# Patient Record
Sex: MALE | HISPANIC OR LATINO | Employment: FULL TIME | URBAN - METROPOLITAN AREA
[De-identification: names, ages, dates, MRNs, and addresses within clinical notes are randomized per-mention and may not be internally consistent; named-entity substitution may affect disease eponyms.]

---

## 2018-09-05 ENCOUNTER — HOSPITAL ENCOUNTER (OUTPATIENT)
Dept: RADIOLOGY | Facility: MEDICAL CENTER | Age: 23
End: 2018-09-05
Attending: EMERGENCY MEDICINE
Payer: COMMERCIAL

## 2018-09-05 ENCOUNTER — OFFICE VISIT (OUTPATIENT)
Dept: URGENT CARE | Facility: PHYSICIAN GROUP | Age: 23
End: 2018-09-05
Payer: COMMERCIAL

## 2018-09-05 VITALS
TEMPERATURE: 97.2 F | HEART RATE: 78 BPM | BODY MASS INDEX: 35.99 KG/M2 | WEIGHT: 243 LBS | SYSTOLIC BLOOD PRESSURE: 124 MMHG | OXYGEN SATURATION: 97 % | RESPIRATION RATE: 12 BRPM | DIASTOLIC BLOOD PRESSURE: 78 MMHG | HEIGHT: 69 IN

## 2018-09-05 DIAGNOSIS — S46.912A STRAIN OF LEFT SHOULDER, INITIAL ENCOUNTER: ICD-10-CM

## 2018-09-05 PROCEDURE — 99214 OFFICE O/P EST MOD 30 MIN: CPT | Performed by: EMERGENCY MEDICINE

## 2018-09-05 PROCEDURE — 73000 X-RAY EXAM OF COLLAR BONE: CPT | Mod: LT

## 2018-09-05 PROCEDURE — 73030 X-RAY EXAM OF SHOULDER: CPT | Mod: LT

## 2018-09-05 ASSESSMENT — PAIN SCALES - GENERAL: PAINLEVEL: 8=MODERATE-SEVERE PAIN

## 2018-09-05 NOTE — PROGRESS NOTES
"Subjective:      Corona Whitt is a 23 y.o. male who presents with Shoulder Pain (Lt shoulder pain, limited ROM x2days )            HPI  Patient is a 23-year-old male with shoulder pain and limited range of motion.    Patient gives no history of obvious trauma.    PMH:  has no past medical history of ASTHMA or Diabetes.  MEDS:   Current Outpatient Prescriptions:   •  sulfamethoxazole-trimethoprim (BACTRIM DS) 800-160 MG tablet, Take 1 Tab by mouth 2 times a day., Disp: 30 Tab, Rfl: 1  •  amoxicillin-clavulanate (AUGMENTIN) 875-125 MG Tab, Take 1 Tab by mouth 2 times a day., Disp: 30 Tab, Rfl: 1  •  oxycodone immediate-release (ROXICODONE) 5 MG Tab, Take 1 Tab by mouth every 6 hours as needed for Severe Pain. (Patient not taking: Reported on 9/20/2016), Disp: 30 Tab, Rfl: 0  •  ibuprofen (MOTRIN) 200 MG Tab, Take 600 mg by mouth every 6 hours as needed for Mild Pain., Disp: , Rfl:   ALLERGIES:   Allergies   Allergen Reactions   • Other Food Swelling     Patient reports all tree nuts, causes throat to swell     SURGHX:   Past Surgical History:   Procedure Laterality Date   • OTHER ORTHOPEDIC SURGERY      torn meniscus right knee     SOCHX:  reports that he has been smoking.  He has never used smokeless tobacco. He reports that he uses drugs, including Marijuana.  FH: family history includes Diabetes in his father.  ROS       Objective:     /78   Pulse 78   Temp 36.2 °C (97.2 °F)   Resp 12   Ht 1.753 m (5' 9\")   Wt 110.2 kg (243 lb)   SpO2 97%   BMI 35.88 kg/m²      Physical Exam            Assessment/Plan:     Diagnosis: Left shoulder pain.      "

## 2018-09-05 NOTE — LETTER
September 5, 2018        Corona Whitt  [address] [facility] [address]        Dear Corona:    Please ask to be excused from work for the next three days.    If you have any questions or concerns, please don't hesitate to call.        Sincerely,        Jf Garcia M.D.    Electronically Signed

## 2019-12-09 ENCOUNTER — HOSPITAL ENCOUNTER (OUTPATIENT)
Facility: MEDICAL CENTER | Age: 24
End: 2019-12-09
Attending: PHYSICIAN ASSISTANT
Payer: COMMERCIAL

## 2019-12-09 ENCOUNTER — OFFICE VISIT (OUTPATIENT)
Dept: URGENT CARE | Facility: PHYSICIAN GROUP | Age: 24
End: 2019-12-09
Payer: COMMERCIAL

## 2019-12-09 VITALS
BODY MASS INDEX: 37.22 KG/M2 | TEMPERATURE: 97.2 F | OXYGEN SATURATION: 98 % | SYSTOLIC BLOOD PRESSURE: 128 MMHG | HEART RATE: 68 BPM | HEIGHT: 70 IN | WEIGHT: 260 LBS | RESPIRATION RATE: 16 BRPM | DIASTOLIC BLOOD PRESSURE: 84 MMHG

## 2019-12-09 DIAGNOSIS — N30.01 ACUTE CYSTITIS WITH HEMATURIA: ICD-10-CM

## 2019-12-09 DIAGNOSIS — Z72.51 HIGH RISK HETEROSEXUAL BEHAVIOR: ICD-10-CM

## 2019-12-09 DIAGNOSIS — R30.0 DYSURIA: ICD-10-CM

## 2019-12-09 LAB
APPEARANCE UR: NORMAL
BILIRUB UR STRIP-MCNC: NEGATIVE MG/DL
COLOR UR AUTO: YELLOW
GLUCOSE UR STRIP.AUTO-MCNC: NEGATIVE MG/DL
KETONES UR STRIP.AUTO-MCNC: NEGATIVE MG/DL
LEUKOCYTE ESTERASE UR QL STRIP.AUTO: NORMAL
NITRITE UR QL STRIP.AUTO: NEGATIVE
PH UR STRIP.AUTO: 6.5 [PH] (ref 5–8)
PROT UR QL STRIP: NEGATIVE MG/DL
RBC UR QL AUTO: NORMAL
SP GR UR STRIP.AUTO: 1.02
UROBILINOGEN UR STRIP-MCNC: 0.2 MG/DL

## 2019-12-09 PROCEDURE — 87086 URINE CULTURE/COLONY COUNT: CPT

## 2019-12-09 PROCEDURE — 87491 CHLMYD TRACH DNA AMP PROBE: CPT

## 2019-12-09 PROCEDURE — 99214 OFFICE O/P EST MOD 30 MIN: CPT | Performed by: PHYSICIAN ASSISTANT

## 2019-12-09 PROCEDURE — 81002 URINALYSIS NONAUTO W/O SCOPE: CPT | Performed by: PHYSICIAN ASSISTANT

## 2019-12-09 PROCEDURE — 87591 N.GONORRHOEAE DNA AMP PROB: CPT

## 2019-12-09 RX ORDER — SULFAMETHOXAZOLE AND TRIMETHOPRIM 800; 160 MG/1; MG/1
1 TABLET ORAL EVERY 12 HOURS
Qty: 28 TAB | Refills: 0 | Status: SHIPPED | OUTPATIENT
Start: 2019-12-09 | End: 2019-12-23

## 2019-12-09 ASSESSMENT — ENCOUNTER SYMPTOMS
VOMITING: 0
CHILLS: 0
ANOREXIA: 0
FEVER: 0
ABDOMINAL PAIN: 0
NAUSEA: 0
FATIGUE: 0
FLANK PAIN: 0
CHANGE IN BOWEL HABIT: 0
HEADACHES: 0
SORE THROAT: 0
SWEATS: 0
ARTHRALGIAS: 0

## 2019-12-10 LAB
C TRACH DNA SPEC QL NAA+PROBE: NEGATIVE
N GONORRHOEA DNA SPEC QL NAA+PROBE: NEGATIVE
SPECIMEN SOURCE: NORMAL

## 2019-12-10 NOTE — PROGRESS NOTES
Subjective:   Corona Whitt is a 24 y.o. male who presents for UTI (possible, burning sensation when urinating x1week)        Dysuria    This is a new problem. The current episode started in the past 7 days. The problem occurs every urination. The problem has been unchanged. The quality of the pain is described as burning. The pain is mild. There has been no fever. He is sexually active. There is no history of pyelonephritis. Pertinent negatives include no chills, discharge, flank pain, frequency, hematuria, hesitancy, nausea, sweats, urgency or vomiting. He has tried nothing for the symptoms. The treatment provided no relief. There is no history of recurrent UTIs. denies hx of STI   Sexually Transmitted Diseases   This is a new problem. The current episode started more than 1 month ago (unprotected sex with partner of unknown status 2 months ago). The problem occurs constantly. The problem has been unchanged. Pertinent negatives include no abdominal pain, anorexia, arthralgias, change in bowel habit, chills, fatigue, fever, headaches, nausea, sore throat or vomiting. Associated symptoms comments: No penile lesions, discharge, abdominal pain. Nothing aggravates the symptoms. He has tried nothing for the symptoms. The treatment provided no relief.     Review of Systems   Constitutional: Negative for chills, fatigue and fever.   HENT: Negative for sore throat.    Gastrointestinal: Negative for abdominal pain, anorexia, change in bowel habit, nausea and vomiting.   Genitourinary: Positive for dysuria. Negative for flank pain, frequency, hematuria, hesitancy and urgency.   Musculoskeletal: Negative for arthralgias.   Neurological: Negative for headaches.       PMH:  has no past medical history of ASTHMA or Diabetes.  MEDS:   Current Outpatient Medications:   •  sulfamethoxazole-trimethoprim (BACTRIM DS) 800-160 MG tablet, Take 1 Tab by mouth every 12 hours for 14 days., Disp: 28 Tab, Rfl: 0  •  oxycodone  "immediate-release (ROXICODONE) 5 MG Tab, Take 1 Tab by mouth every 6 hours as needed for Severe Pain. (Patient not taking: Reported on 9/20/2016), Disp: 30 Tab, Rfl: 0  •  ibuprofen (MOTRIN) 200 MG Tab, Take 600 mg by mouth every 6 hours as needed for Mild Pain., Disp: , Rfl:   ALLERGIES:   Allergies   Allergen Reactions   • Other Food Swelling     Patient reports all tree nuts, causes throat to swell     SURGHX:   Past Surgical History:   Procedure Laterality Date   • OTHER ORTHOPEDIC SURGERY      torn meniscus right knee     SOCHX:  reports that he has quit smoking. His smoking use included cigarettes. He smoked 0.00 packs per day. He has never used smokeless tobacco. He reports current drug use. Drug: Marijuana.  FH: Family history was reviewed, no pertinent findings to report   Objective:   /84 (BP Location: Left arm, Patient Position: Sitting, BP Cuff Size: Large adult)   Pulse 68   Temp 36.2 °C (97.2 °F) (Temporal)   Resp 16   Ht 1.778 m (5' 10\")   Wt 117.9 kg (260 lb)   SpO2 98%   BMI 37.31 kg/m²   Physical Exam  Vitals signs reviewed.   Constitutional:       General: He is not in acute distress.     Appearance: Normal appearance. He is well-developed. He is not toxic-appearing.   HENT:      Head: Normocephalic and atraumatic.      Right Ear: External ear normal.      Left Ear: External ear normal.      Nose: Nose normal.   Eyes:      General: Lids are normal.      Conjunctiva/sclera: Conjunctivae normal.   Neck:      Musculoskeletal: Neck supple.   Cardiovascular:      Rate and Rhythm: Normal rate and regular rhythm.   Pulmonary:      Effort: Pulmonary effort is normal. No respiratory distress.      Breath sounds: Normal breath sounds.   Abdominal:      General: Abdomen is flat.      Palpations: Abdomen is soft.      Tenderness: There is no right CVA tenderness, left CVA tenderness, guarding or rebound. Negative signs include Roque's sign, Rovsing's sign and McBurney's sign.      Hernia: There " is no hernia in the right inguinal area or left inguinal area.   Genitourinary:     Penis: Circumcised. No phimosis, paraphimosis, hypospadias, erythema, tenderness, discharge, swelling or lesions.       Scrotum/Testes: Normal.         Right: Mass, tenderness, swelling, testicular hydrocele or varicocele not present. Right testis is descended.         Left: Mass, tenderness, swelling, testicular hydrocele or varicocele not present. Left testis is descended.      Epididymis:      Right: Normal.      Left: Normal.   Musculoskeletal:      Comments: Normal range of motion. Exhibits no edema and no tenderness.    Skin:     General: Skin is warm and dry.      Capillary Refill: Capillary refill takes less than 2 seconds.   Neurological:      Mental Status: He is alert and oriented to person, place, and time.      Cranial Nerves: No cranial nerve deficit.      Sensory: No sensory deficit.   Psychiatric:         Speech: Speech normal.         Behavior: Behavior normal.         Thought Content: Thought content normal.         Judgment: Judgment normal.           Assessment/Plan:   1. Acute cystitis with hematuria  - Urine Culture; Future  - sulfamethoxazole-trimethoprim (BACTRIM DS) 800-160 MG tablet; Take 1 Tab by mouth every 12 hours for 14 days.  Dispense: 28 Tab; Refill: 0    2. Dysuria  - POCT Urinalysis  - CHLAMYDIA/GC PCR URINE OR SWAB; Future    3. High risk heterosexual behavior  - CHLAMYDIA/GC PCR URINE OR SWAB; Future  - HIV AG/AB COMBO ASSAY SCREENING; Future  - HSV 1/2 IGG W/ TYPE SPECIFIC RFLX; Future  - T.PALLIDUM AB EIA    Results for orders placed or performed in visit on 12/09/19   POCT Urinalysis   Result Value Ref Range    POC Color Yellow Negative    POC Appearance Slightly Cloudy Negative    POC Leukocyte Esterase Small Negative    POC Nitrites Negative Negative    POC Urobiligen 0.2 Negative (0.2) mg/dL    POC Protein Negative Negative mg/dL    POC Urine PH 6.5 5.0 - 8.0    POC Blood Trace-intact  Negative    POC Specific Gravity 1.025 <1.005 - >1.030    POC Ketones Negative Negative mg/dL    POC Bilirubin Negative Negative mg/dL    POC Glucose Negative Negative mg/dL      UA and PE consistent with acute cystitis. Pyelonephritis unlikely as pt has no flank pain, CVA tenderness, N/V, F/C. G/C are also considerations, however no d/c and pt is asymptomatic.  I will screen and G/C and other STI due to increased risk.  I will contact pt with results and adjust tx plan as indicated.    Pt instructed to complete full course of medication despite symptomatic improvement. Pt to take med with meals to alleviate GI upset. Pt to take a probiotic or eat Emily’s yogurt/ kefir while taking the medication.    Differential diagnosis, natural history, supportive care, and indications for immediate follow-up discussed.

## 2019-12-12 LAB
BACTERIA UR CULT: NORMAL
SIGNIFICANT IND 70042: NORMAL
SITE SITE: NORMAL
SOURCE SOURCE: NORMAL

## 2019-12-18 ENCOUNTER — HOSPITAL ENCOUNTER (OUTPATIENT)
Dept: LAB | Facility: MEDICAL CENTER | Age: 24
End: 2019-12-18
Attending: PHYSICIAN ASSISTANT
Payer: COMMERCIAL

## 2019-12-18 DIAGNOSIS — Z72.51 HIGH RISK HETEROSEXUAL BEHAVIOR: ICD-10-CM

## 2019-12-18 LAB — TREPONEMA PALLIDUM IGG+IGM AB [PRESENCE] IN SERUM OR PLASMA BY IMMUNOASSAY: NON REACTIVE

## 2019-12-18 PROCEDURE — 86780 TREPONEMA PALLIDUM: CPT

## 2019-12-18 PROCEDURE — 36415 COLL VENOUS BLD VENIPUNCTURE: CPT

## 2019-12-18 PROCEDURE — 87389 HIV-1 AG W/HIV-1&-2 AB AG IA: CPT

## 2019-12-18 PROCEDURE — 86694 HERPES SIMPLEX NES ANTBDY: CPT

## 2019-12-19 LAB — HIV 1+2 AB+HIV1 P24 AG SERPL QL IA: NON REACTIVE

## 2019-12-20 LAB — HSV1+2 IGG SER IA-ACNC: 0.64 IV

## 2020-05-05 ENCOUNTER — HOSPITAL ENCOUNTER (OUTPATIENT)
Facility: MEDICAL CENTER | Age: 25
End: 2020-05-05
Attending: PHYSICIAN ASSISTANT
Payer: COMMERCIAL

## 2020-05-05 ENCOUNTER — OFFICE VISIT (OUTPATIENT)
Dept: URGENT CARE | Facility: PHYSICIAN GROUP | Age: 25
End: 2020-05-05
Payer: COMMERCIAL

## 2020-05-05 VITALS
HEIGHT: 70 IN | WEIGHT: 258 LBS | HEART RATE: 88 BPM | SYSTOLIC BLOOD PRESSURE: 132 MMHG | TEMPERATURE: 97 F | OXYGEN SATURATION: 98 % | BODY MASS INDEX: 36.94 KG/M2 | DIASTOLIC BLOOD PRESSURE: 72 MMHG | RESPIRATION RATE: 12 BRPM

## 2020-05-05 DIAGNOSIS — R30.0 DYSURIA: ICD-10-CM

## 2020-05-05 LAB
APPEARANCE UR: CLEAR
BILIRUB UR STRIP-MCNC: NEGATIVE MG/DL
C TRACH DNA SPEC QL NAA+PROBE: NEGATIVE
COLOR UR AUTO: YELLOW
GLUCOSE UR STRIP.AUTO-MCNC: NEGATIVE MG/DL
KETONES UR STRIP.AUTO-MCNC: NEGATIVE MG/DL
LEUKOCYTE ESTERASE UR QL STRIP.AUTO: NEGATIVE
N GONORRHOEA DNA SPEC QL NAA+PROBE: NEGATIVE
NITRITE UR QL STRIP.AUTO: NEGATIVE
PH UR STRIP.AUTO: 6 [PH] (ref 5–8)
PROT UR QL STRIP: NEGATIVE MG/DL
RBC UR QL AUTO: NEGATIVE
SP GR UR STRIP.AUTO: 1.02
SPECIMEN SOURCE: NORMAL
UROBILINOGEN UR STRIP-MCNC: 0.2 MG/DL

## 2020-05-05 PROCEDURE — 87491 CHLMYD TRACH DNA AMP PROBE: CPT

## 2020-05-05 PROCEDURE — 81002 URINALYSIS NONAUTO W/O SCOPE: CPT | Performed by: PHYSICIAN ASSISTANT

## 2020-05-05 PROCEDURE — 99214 OFFICE O/P EST MOD 30 MIN: CPT | Performed by: PHYSICIAN ASSISTANT

## 2020-05-05 PROCEDURE — 87086 URINE CULTURE/COLONY COUNT: CPT

## 2020-05-05 PROCEDURE — 87591 N.GONORRHOEAE DNA AMP PROB: CPT

## 2020-05-05 RX ORDER — CEPHALEXIN 500 MG/1
500 CAPSULE ORAL 4 TIMES DAILY
Qty: 20 CAP | Refills: 0 | Status: SHIPPED | OUTPATIENT
Start: 2020-05-05 | End: 2020-05-10

## 2020-05-05 RX ORDER — SULFAMETHOXAZOLE AND TRIMETHOPRIM 800; 160 MG/1; MG/1
1 TABLET ORAL EVERY 12 HOURS
Qty: 28 TAB | Refills: 0 | Status: CANCELLED | OUTPATIENT
Start: 2020-05-05 | End: 2020-05-19

## 2020-05-05 ASSESSMENT — ENCOUNTER SYMPTOMS
ABDOMINAL PAIN: 0
FEVER: 0
BLOOD IN STOOL: 0
NAUSEA: 0
FLANK PAIN: 0
CHILLS: 0
VOMITING: 0
BACK PAIN: 0
COUGH: 0
SHORTNESS OF BREATH: 0
DIARRHEA: 0
MYALGIAS: 0
CONSTIPATION: 0

## 2020-05-05 NOTE — PROGRESS NOTES
"Subjective:   Corona Whitt  is a 25 y.o. male who presents for Urinary Pain (Urinary irritation x2days )      HPI  Patient comes to clinic noting mild intermittent sensation of dysuria beginning and end of stream.  Notes happened initially last December was seen through urgent care, urinalysis showed small leuks complete STI testing and urine culture came back negative at that time.  Patient notes improved symptoms thereafter having had a course of Bactrim.  This time he notes initial sensation of mild dysuria around 2 weeks ago.  Has had mild intermittent similar sensation.  Denies other symptoms of UTI.  Denies fevers chills nausea vomiting.  Denies abdominal pain or back pain.  Denies history of diagnosed kidney stone pyelonephritis or urinary tract infection.  Denies concern for new sexual partners but notes he does have unprotected sex with his current girlfriend.  Denies penile discharge or genital rash.  Review of Systems   Constitutional: Negative for chills and fever.   Respiratory: Negative for cough and shortness of breath.    Gastrointestinal: Negative for abdominal pain, blood in stool, constipation, diarrhea, nausea and vomiting.   Genitourinary: Positive for dysuria ( Mild, inconsistent). Negative for flank pain, frequency, hematuria and urgency.   Musculoskeletal: Negative for back pain and myalgias.   Skin: Negative for rash.       Allergies   Allergen Reactions   • Other Food Swelling     Patient reports all tree nuts, causes throat to swell        Objective:   /72   Pulse 88   Temp 36.1 °C (97 °F) (Temporal)   Resp 12   Ht 1.778 m (5' 10\")   Wt 117 kg (258 lb)   SpO2 98%   BMI 37.02 kg/m²     Physical Exam  Vitals signs and nursing note reviewed.   Constitutional:       General: He is not in acute distress.     Appearance: Normal appearance. He is well-developed. He is not diaphoretic.   HENT:      Head: Normocephalic and atraumatic.      Right Ear: External ear normal.      Left " Ear: External ear normal.      Nose: Nose normal.   Eyes:      General: No scleral icterus.        Right eye: No discharge.         Left eye: No discharge.      Conjunctiva/sclera: Conjunctivae normal.   Neck:      Musculoskeletal: Neck supple.   Pulmonary:      Effort: Pulmonary effort is normal. No respiratory distress.   Abdominal:      Palpations: Abdomen is soft. Abdomen is not rigid.      Tenderness: There is no abdominal tenderness. There is no guarding.   Musculoskeletal: Normal range of motion.   Skin:     General: Skin is warm and dry.      Coloration: Skin is not pale.   Neurological:      Mental Status: He is alert and oriented to person, place, and time.      Coordination: Coordination normal.          Results for orders placed or performed in visit on 05/05/20   POCT Urinalysis   Result Value Ref Range    POC Color YELLOW Negative    POC Appearance CLEAR Negative    POC Leukocyte Esterase NEGATIVE Negative    POC Nitrites NEGATIVE Negative    POC Urobiligen 0.2 Negative (0.2) mg/dL    POC Protein NEGATIVE Negative mg/dL    POC Urine PH 6.0 5.0 - 8.0    POC Blood NEGATIVE Negative    POC Specific Gravity 1.025 <1.005 - >1.030    POC Ketones NEGATIVE Negative mg/dL    POC Bilirubin NEGATIVE Negative mg/dL    POC Glucose NEGATIVE Negative mg/dL           Assessment/Plan:   1. Dysuria  - POCT Urinalysis  - CHLAMYDIA/GC PCR URINE OR SWAB; Future  - URINE CULTURE(NEW); Future  - REFERRAL TO UROLOGY  - cephALEXin (KEFLEX) 500 MG Cap; Take 1 Cap by mouth 4 times a day for 5 days.  Dispense: 20 Cap; Refill: 0  Supportive care is reviewed with patient/caregiver - recommend to push PO fluids and electrolytes, nsaids/tylenol, rest, full course of abx, probiotics w/ abx, azo/cran, observe for resolution  Return to clinic with lack of resolution or progression of symptoms.  Will call if change of abx is indicated by urine cx  Patient is directed to [facility], [facility] or primary care for a  complete battery of STI testing    I have worn an N95 mask, gloves and eye protection for the entire encounter with this patient.     Differential diagnosis, natural history, supportive care, and indications for immediate follow-up discussed.

## 2020-05-05 NOTE — PATIENT INSTRUCTIONS
Urinary Tract Infection, Adult  Introduction  A urinary tract infection (UTI) is an infection of any part of the urinary tract. The urinary tract includes the:  · Kidneys.  · Ureters.  · Bladder.  · Urethra.  These organs make, store, and get rid of pee (urine) in the body.  Follow these instructions at home:  · Take over-the-counter and prescription medicines only as told by your doctor.  · If you were prescribed an antibiotic medicine, take it as told by your doctor. Do not stop taking the antibiotic even if you start to feel better.  · Avoid the following drinks:  ¨ Alcohol.  ¨ Caffeine.  ¨ Tea.  ¨ Carbonated drinks.  · Drink enough fluid to keep your pee clear or pale yellow.  · Keep all follow-up visits as told by your doctor. This is important.  · Make sure to:  ¨ Empty your bladder often and completely. Do not to hold pee for long periods of time.  ¨ Empty your bladder before and after sex.  ¨ Wipe from front to back after a bowel movement if you are female. Use each tissue one time when you wipe.  Contact a doctor if:  · You have back pain.  · You have a fever.  · You feel sick to your stomach (nauseous).  · You throw up (vomit).  · Your symptoms do not get better after 3 days.  · Your symptoms go away and then come back.  Get help right away if:  · You have very bad back pain.  · You have very bad lower belly (abdominal) pain.  · You are throwing up and cannot keep down any medicines or water.  This information is not intended to replace advice given to you by your health care provider. Make sure you discuss any questions you have with your health care provider.  Document Released: 06/05/2009 Document Revised: 05/25/2017 Document Reviewed: 11/07/2016  © 2017 [employer]      Urinary Tract Infection, Adult  Introduction  A urinary tract infection (UTI) is an infection of any part of the urinary tract. The urinary tract includes the:  · Kidneys.  · Ureters.  · Bladder.  · Urethra.  These organs make, store,  and get rid of pee (urine) in the body.  Follow these instructions at home:  · Take over-the-counter and prescription medicines only as told by your doctor.  · If you were prescribed an antibiotic medicine, take it as told by your doctor. Do not stop taking the antibiotic even if you start to feel better.  · Avoid the following drinks:  ¨ Alcohol.  ¨ Caffeine.  ¨ Tea.  ¨ Carbonated drinks.  · Drink enough fluid to keep your pee clear or pale yellow.  · Keep all follow-up visits as told by your doctor. This is important.  · Make sure to:  ¨ Empty your bladder often and completely. Do not to hold pee for long periods of time.  ¨ Empty your bladder before and after sex.  ¨ Wipe from front to back after a bowel movement if you are female. Use each tissue one time when you wipe.  Contact a doctor if:  · You have back pain.  · You have a fever.  · You feel sick to your stomach (nauseous).  · You throw up (vomit).  · Your symptoms do not get better after 3 days.  · Your symptoms go away and then come back.  Get help right away if:  · You have very bad back pain.  · You have very bad lower belly (abdominal) pain.  · You are throwing up and cannot keep down any medicines or water.  This information is not intended to replace advice given to you by your health care provider. Make sure you discuss any questions you have with your health care provider.  Document Released: 06/05/2009 Document Revised: 05/25/2017 Document Reviewed: 11/07/2016  © 2017 [employer]

## 2020-05-07 LAB
BACTERIA UR CULT: NORMAL
SIGNIFICANT IND 70042: NORMAL
SITE SITE: NORMAL
SOURCE SOURCE: NORMAL

## 2022-04-24 ENCOUNTER — OFFICE VISIT (OUTPATIENT)
Dept: URGENT CARE | Facility: PHYSICIAN GROUP | Age: 27
End: 2022-04-24
Payer: COMMERCIAL

## 2022-04-24 VITALS
SYSTOLIC BLOOD PRESSURE: 130 MMHG | HEIGHT: 70 IN | DIASTOLIC BLOOD PRESSURE: 80 MMHG | BODY MASS INDEX: 37.22 KG/M2 | TEMPERATURE: 97.5 F | HEART RATE: 79 BPM | RESPIRATION RATE: 16 BRPM | WEIGHT: 260 LBS | OXYGEN SATURATION: 96 %

## 2022-04-24 DIAGNOSIS — Z23 NEED FOR TDAP VACCINATION: ICD-10-CM

## 2022-04-24 DIAGNOSIS — S30.0XXA COCCYGEAL CONTUSION, INITIAL ENCOUNTER: ICD-10-CM

## 2022-04-24 DIAGNOSIS — S80.812A ABRASION OF LEFT LOWER EXTREMITY, INITIAL ENCOUNTER: ICD-10-CM

## 2022-04-24 PROCEDURE — 90715 TDAP VACCINE 7 YRS/> IM: CPT | Performed by: EMERGENCY MEDICINE

## 2022-04-24 PROCEDURE — 90471 IMMUNIZATION ADMIN: CPT | Performed by: EMERGENCY MEDICINE

## 2022-04-24 PROCEDURE — 99213 OFFICE O/P EST LOW 20 MIN: CPT | Mod: 25 | Performed by: EMERGENCY MEDICINE

## 2022-04-24 ASSESSMENT — ENCOUNTER SYMPTOMS
BACK PAIN: 0
SENSORY CHANGE: 0
FOCAL WEAKNESS: 0

## 2022-04-24 NOTE — PROGRESS NOTES
"Subjective     Corona Whitt is a 27 y.o. male who presents with Leg Injury (Fell and landed on a nail and puncture (L) thigh x 1 day)            Leg Injury   Incident onset: yesterday. The incident occurred at home. The injury mechanism was a fall. The pain is present in the left thigh. He reports no foreign bodies present.   Patient states while working on home project, being he was walking on broke and he landed on his tailbone area.  Also notes abrasions from nails on left thigh.  Not up-to-date on tetanus booster.  No other trauma.  Review of Systems   Musculoskeletal: Negative for back pain and joint pain.   Neurological: Negative for sensory change and focal weakness.              Objective     /80 (BP Location: Left arm, Patient Position: Standing, BP Cuff Size: Large adult)   Pulse 79   Temp 36.4 °C (97.5 °F) (Temporal)   Resp 16   Ht 1.778 m (5' 10\")   Wt 118 kg (260 lb)   SpO2 96%   BMI 37.31 kg/m²      Physical Exam  Constitutional:       General: He is not in acute distress.     Appearance: He is well-developed.   Musculoskeletal:        Back:    Skin:     General: Skin is warm and dry.      Findings: Wound present.          Neurological:      Mental Status: He is alert.      Gait: Gait normal.   Psychiatric:         Behavior: Behavior is cooperative.                         Advised may consider imaging if desired.    Assessment & Plan        1. Coccygeal contusion, initial encounter  Ice, OTC analgesia as needed, unweighting cushion as needed.    2. Abrasion of left lower extremity, initial encounter  Cleansed and dressed with antibiotic ointment.  Home care instructions provided.  Recheck in 2 to 3 days if any concerns.    3. Need for Tdap vaccination  - Tdap =>8yo IM                "

## 2022-04-24 NOTE — LETTER
April 25, 2022         Patient: Corona Whitt   YOB: 1995   Date of Visit: 4/24/2022           To Whom it May Concern:    Corona Whitt was seen in my clinic on 4/24/2022. He may return to work on 4/27/2022.    If you have any questions or concerns, please don't hesitate to call.        Sincerely,           Marcelo Babcock M.D.  Electronically Signed

## 2022-04-24 NOTE — PATIENT INSTRUCTIONS
Tailbone Injury    The tailbone (coccyx) is the small bone at the lower end of the spine. A tailbone injury may involve stretched ligaments, bruising, or a broken bone (fracture). Tailbone injuries can be painful, and some may take a long time to heal.  What are the causes?  This condition may be caused by:  · Falling and landing on the tailbone.  · Repeated strain or friction from sitting for long periods of time. This may include actions such as rowing and bicycling.  · Childbirth.  In some cases, the cause may not be known.  What are the signs or symptoms?  Symptoms of this condition include:  · Pain in the tailbone area or lower back, especially when sitting.  · Pain or difficulty when standing up from a sitting position.  · Bruising or swelling in the tailbone area.  · Painful bowel movements.  · In women, pain during intercourse.  How is this diagnosed?  This condition may be diagnosed based on:  · Your symptoms.  · A physical exam.  If your health care provider suspects a fracture, you may have additional tests, such as:  · X-rays.  · CT scan.  · MRI.  How is this treated?  Most tailbone injuries heal on their own in 4-6 weeks. However, recovery time may be longer if the injury involves a fracture.  Treatment for this condition may include:  · NSAIDs or other over-the-counter medicines to help relieve your pain.  · Using a large, rubber or inflated ring or cushion to take pressure off the tailbone when sitting.  · Physical therapy.  · Injecting the tailbone area with local anesthesia and steroid medicine. This is not normally needed unless the pain does not improve over time with over-the-counter pain medicines.  Follow these instructions at home:  Activity  · Avoid sitting for long periods of time.  · To prevent repeating an injury that is caused by strain or friction:  ? Wear appropriate padding and sports gear when bicycling and rowing.  · Increase your activity as the pain allows. Perform any exercises  that are recommended by your health care provider or physical therapist.  Managing pain, stiffness, and swelling  · To help decrease discomfort when sitting:  ? Sit on your rubber or inflated ring or cushion as told by your health care provider.  ? Lean forward when you sit.  · If directed, apply ice to the injured area:  ? Put ice in a plastic bag.  ? Place a towel between your skin and the bag.  ? Leave the ice on for 20 minutes, 2-3 times per day for the first 1-2 days.  · If directed, apply heat to the affected area as often as told by your health care provider. Use the heat source that your health care provider recommends, such as a moist heat pack or a heating pad.  ? Place a towel between your skin and the heat source.  ? Leave the heat on for 20-30 minutes.  ? Remove the heat if your skin turns bright red. This is especially important if you are unable to feel pain, heat, or cold. You may have a greater risk of getting burned.  General instructions  · Take over-the-counter and prescription medicines only as told by your health care provider.  · To prevent or treat constipation or painful bowel movements, your health care provider may recommend that you:  ? Drink enough fluid to keep your urine pale yellow.  ? Eat foods that are high in fiber, such as fresh fruits and vegetables, whole grains, and beans.  ? Limit foods that are high in fat and processed sugars, such as fried and sweet foods.  ? Take an over-the-counter or prescription medicine for constipation.  · Keep all follow-up visits as directed by your health care provider. This is important.  Contact a health care provider if:  · Your pain becomes worse or is not controlled with medicine.  · Your bowel movements cause a great deal of discomfort.  · You are unable to have a bowel movement after 4 days.  · You have pain during intercourse.  Summary  · A tailbone injury may involve stretched ligaments, bruising, or a broken bone (fracture).  · Tailbone  injuries can be painful. Most heal on their own in 4-6 weeks.  · Treatment may include taking NSAIDs, using a rubber or inflated ring or cushion when sitting, and physical therapy.  · Follow any recommendations from your health care provider to prevent or treat constipation.  This information is not intended to replace advice given to you by your health care provider. Make sure you discuss any questions you have with your health care provider.  Document Released: 12/15/2001 Document Revised: 01/15/2019 Document Reviewed: 01/15/2019  Elsevier Patient Education © 2020 [employer]  Abrasion    An abrasion is a cut or a scrape on the outer surface of the skin. An abrasion does not go through all the layers of the skin. It is important to care for your abrasion properly to prevent infection.  What are the causes?  This condition is caused by falling on or gliding across the ground or another surface. When your skin rubs on something, the outer and inner layers of skin may rub off.  What are the signs or symptoms?  The main symptom of this condition is a cut or a scrape. The scrape may be bleeding, or it may appear red or pink. If the abrasion was caused by a fall, there may be a bruise under the cut or scrape.  How is this diagnosed?  An abrasion is diagnosed with a physical exam.  How is this treated?  Treatment for this condition depends on how large and deep the abrasion is. In most cases:  · Your abrasion will be cleaned with water and mild soap. This is done to remove any dirt or debris (such as particles of glass or rock) that may be stuck in the wound.  · An antibiotic ointment may be applied to the abrasion to help prevent infection.  · A bandage (dressing) may be placed on the abrasion to keep it clean.  You may also need a tetanus shot.  Follow these instructions at home:  Medicines  · Take or apply over-the-counter and prescription medicines only as told by your health care provider.  · If you were  prescribed an antibiotic medicine, apply it as told by your health care provider.  Wound care  · Clean the wound 2-3 times a day, or as directed by your health care provider. To do this, wash the wound with mild soap and water, rinse off the soap, and pat the wound dry with a clean towel. Do not rub the wound.  · Keep the dressing clean and dry as told by your health care provider.  · There are many different ways to close and cover a wound. Follow instructions from your health care provider about:  ? Caring for your wound.  ? Changing and removing your dressing. You may have to change your dressing one or more times a day, or as directed by your health care provider.  · Check your wound every day for signs of infection. Check for:  ? Redness, particularly a red streak that spreads out from the wound.  ? Swelling or increased pain.  ? Warmth.  ? Fluid, pus, or a bad smell.  · If directed, put ice on the injured area to reduce pain and swelling:  ? Put ice in a plastic bag.  ? Place a towel between your skin and the bag.  ? Leave the ice on for 20 minutes, 2-3 times a day.  General instructions  · Do not take baths, swim, or use a hot tub until your health care provider says it is okay to do so.  · If possible, raise (elevate) the injured area above the level of your heart while you are sitting or lying down. This will reduce pain and swelling.  · Keep all follow-up visits as directed by your health care provider. This is important.  Contact a health care provider if:  · You received a tetanus shot, and you have swelling, severe pain, redness, or bleeding at the injection site.  · Your pain is not controlled with medicine.  · You have redness, swelling, or more pain at the site of your wound.  Get help right away if:  · You have a red streak spreading away from your wound.  · You have a fever.  · You have fluid, blood, or pus coming from your wound.  · You notice a bad smell coming from your wound or your  dressing.  Summary  · An abrasion is a cut or a scrape on the outer surface of the skin. An abrasion does not go through all the layers of the skin.  · Care for your abrasion properly to prevent infection.  · Clean the wound with mild soap and water 2-3 times a day. Follow instructions from your health care provider about taking medicines and changing your bandage (dressing).  · Contact your health care provider if you have redness, swelling or more pain in the wound area.  · Get help right away if you have a fever or if you have fluid, blood, pus, a bad smell, or a red streak coming from the wound.  This information is not intended to replace advice given to you by your health care provider. Make sure you discuss any questions you have with your health care provider.  Document Released: 09/27/2006 Document Revised: 11/30/2018 Document Reviewed: 08/01/2018  Elsevier Patient Education © 2020 Elsevier Inc.

## 2022-04-25 ENCOUNTER — APPOINTMENT (OUTPATIENT)
Dept: URGENT CARE | Facility: PHYSICIAN GROUP | Age: 27
End: 2022-04-25
Payer: COMMERCIAL

## 2022-05-06 ENCOUNTER — TELEPHONE (OUTPATIENT)
Dept: SCHEDULING | Facility: IMAGING CENTER | Age: 27
End: 2022-05-06

## 2022-05-16 SDOH — ECONOMIC STABILITY: HOUSING INSECURITY

## 2022-05-16 SDOH — HEALTH STABILITY: PHYSICAL HEALTH: ON AVERAGE, HOW MANY DAYS PER WEEK DO YOU ENGAGE IN MODERATE TO STRENUOUS EXERCISE (LIKE A BRISK WALK)?: 2 DAYS

## 2022-05-16 SDOH — ECONOMIC STABILITY: TRANSPORTATION INSECURITY
IN THE PAST 12 MONTHS, HAS LACK OF TRANSPORTATION KEPT YOU FROM MEETINGS, WORK, OR FROM GETTING THINGS NEEDED FOR DAILY LIVING?: NO

## 2022-05-16 SDOH — ECONOMIC STABILITY: FOOD INSECURITY: WITHIN THE PAST 12 MONTHS, YOU WORRIED THAT YOUR FOOD WOULD RUN OUT BEFORE YOU GOT MONEY TO BUY MORE.: PATIENT DECLINED

## 2022-05-16 SDOH — HEALTH STABILITY: MENTAL HEALTH
STRESS IS WHEN SOMEONE FEELS TENSE, NERVOUS, ANXIOUS, OR CAN'T SLEEP AT NIGHT BECAUSE THEIR MIND IS TROUBLED. HOW STRESSED ARE YOU?: NOT AT ALL

## 2022-05-16 SDOH — ECONOMIC STABILITY: HOUSING INSECURITY
IN THE LAST 12 MONTHS, WAS THERE A TIME WHEN YOU DID NOT HAVE A STEADY PLACE TO SLEEP OR SLEPT IN A SHELTER (INCLUDING NOW)?: PATIENT REFUSED

## 2022-05-16 SDOH — ECONOMIC STABILITY: TRANSPORTATION INSECURITY
IN THE PAST 12 MONTHS, HAS THE LACK OF TRANSPORTATION KEPT YOU FROM MEDICAL APPOINTMENTS OR FROM GETTING MEDICATIONS?: NO

## 2022-05-16 SDOH — ECONOMIC STABILITY: INCOME INSECURITY: HOW HARD IS IT FOR YOU TO PAY FOR THE VERY BASICS LIKE FOOD, HOUSING, MEDICAL CARE, AND HEATING?: SOMEWHAT HARD

## 2022-05-16 SDOH — ECONOMIC STABILITY: FOOD INSECURITY: WITHIN THE PAST 12 MONTHS, THE FOOD YOU BOUGHT JUST DIDN'T LAST AND YOU DIDN'T HAVE MONEY TO GET MORE.: PATIENT DECLINED

## 2022-05-16 SDOH — HEALTH STABILITY: PHYSICAL HEALTH: ON AVERAGE, HOW MANY MINUTES DO YOU ENGAGE IN EXERCISE AT THIS LEVEL?: 60 MIN

## 2022-05-16 SDOH — ECONOMIC STABILITY: INCOME INSECURITY: IN THE LAST 12 MONTHS, WAS THERE A TIME WHEN YOU WERE NOT ABLE TO PAY THE MORTGAGE OR RENT ON TIME?: PATIENT REFUSED

## 2022-05-16 SDOH — ECONOMIC STABILITY: TRANSPORTATION INSECURITY
IN THE PAST 12 MONTHS, HAS LACK OF RELIABLE TRANSPORTATION KEPT YOU FROM MEDICAL APPOINTMENTS, MEETINGS, WORK OR FROM GETTING THINGS NEEDED FOR DAILY LIVING?: NO

## 2022-05-16 ASSESSMENT — SOCIAL DETERMINANTS OF HEALTH (SDOH)
HOW OFTEN DO YOU HAVE A DRINK CONTAINING ALCOHOL: 2-4 TIMES A MONTH
ARE YOU MARRIED, WIDOWED, DIVORCED, SEPARATED, NEVER MARRIED, OR LIVING WITH A PARTNER?: NEVER MARRIED
HOW OFTEN DO YOU ATTENT MEETINGS OF THE CLUB OR ORGANIZATION YOU BELONG TO?: NEVER
IN A TYPICAL WEEK, HOW MANY TIMES DO YOU TALK ON THE PHONE WITH FAMILY, FRIENDS, OR NEIGHBORS?: ONCE A WEEK
HOW OFTEN DO YOU ATTEND CHURCH OR RELIGIOUS SERVICES?: NEVER
HOW OFTEN DO YOU HAVE SIX OR MORE DRINKS ON ONE OCCASION: PATIENT DECLINED
ARE YOU MARRIED, WIDOWED, DIVORCED, SEPARATED, NEVER MARRIED, OR LIVING WITH A PARTNER?: NEVER MARRIED
WITHIN THE PAST 12 MONTHS, YOU WORRIED THAT YOUR FOOD WOULD RUN OUT BEFORE YOU GOT THE MONEY TO BUY MORE: PATIENT DECLINED
DO YOU BELONG TO ANY CLUBS OR ORGANIZATIONS SUCH AS CHURCH GROUPS UNIONS, FRATERNAL OR ATHLETIC GROUPS, OR SCHOOL GROUPS?: NO
IN A TYPICAL WEEK, HOW MANY TIMES DO YOU TALK ON THE PHONE WITH FAMILY, FRIENDS, OR NEIGHBORS?: ONCE A WEEK
HOW OFTEN DO YOU ATTENT MEETINGS OF THE CLUB OR ORGANIZATION YOU BELONG TO?: NEVER
DO YOU BELONG TO ANY CLUBS OR ORGANIZATIONS SUCH AS CHURCH GROUPS UNIONS, FRATERNAL OR ATHLETIC GROUPS, OR SCHOOL GROUPS?: NO
HOW HARD IS IT FOR YOU TO PAY FOR THE VERY BASICS LIKE FOOD, HOUSING, MEDICAL CARE, AND HEATING?: SOMEWHAT HARD
HOW MANY DRINKS CONTAINING ALCOHOL DO YOU HAVE ON A TYPICAL DAY WHEN YOU ARE DRINKING: 1 OR 2
HOW OFTEN DO YOU GET TOGETHER WITH FRIENDS OR RELATIVES?: THREE TIMES A WEEK
HOW OFTEN DO YOU GET TOGETHER WITH FRIENDS OR RELATIVES?: THREE TIMES A WEEK
HOW OFTEN DO YOU ATTEND CHURCH OR RELIGIOUS SERVICES?: NEVER

## 2022-05-16 ASSESSMENT — LIFESTYLE VARIABLES
HOW OFTEN DO YOU HAVE SIX OR MORE DRINKS ON ONE OCCASION: PATIENT DECLINED
HOW MANY STANDARD DRINKS CONTAINING ALCOHOL DO YOU HAVE ON A TYPICAL DAY: 1 OR 2
SKIP TO QUESTIONS 9-10: 0
HOW OFTEN DO YOU HAVE A DRINK CONTAINING ALCOHOL: 2-4 TIMES A MONTH
AUDIT-C TOTAL SCORE: -1

## 2022-05-17 ENCOUNTER — HOSPITAL ENCOUNTER (OUTPATIENT)
Dept: LAB | Facility: MEDICAL CENTER | Age: 27
End: 2022-05-17
Attending: INTERNAL MEDICINE
Payer: COMMERCIAL

## 2022-05-17 ENCOUNTER — OFFICE VISIT (OUTPATIENT)
Dept: MEDICAL GROUP | Facility: PHYSICIAN GROUP | Age: 27
End: 2022-05-17
Payer: COMMERCIAL

## 2022-05-17 VITALS
BODY MASS INDEX: 40.37 KG/M2 | OXYGEN SATURATION: 96 % | HEART RATE: 74 BPM | DIASTOLIC BLOOD PRESSURE: 86 MMHG | SYSTOLIC BLOOD PRESSURE: 134 MMHG | HEIGHT: 70 IN | WEIGHT: 282 LBS | TEMPERATURE: 97.6 F | RESPIRATION RATE: 17 BRPM

## 2022-05-17 DIAGNOSIS — R74.8 LIVER ENZYME ELEVATION: ICD-10-CM

## 2022-05-17 DIAGNOSIS — Z76.89 ENCOUNTER TO ESTABLISH CARE WITH NEW DOCTOR: ICD-10-CM

## 2022-05-17 DIAGNOSIS — E78.5 DYSLIPIDEMIA: ICD-10-CM

## 2022-05-17 DIAGNOSIS — Z00.00 WELL ADULT EXAM: ICD-10-CM

## 2022-05-17 DIAGNOSIS — E66.9 OBESITY (BMI 30-39.9): ICD-10-CM

## 2022-05-17 PROBLEM — R73.03 PREDIABETES: Status: ACTIVE | Noted: 2022-05-17

## 2022-05-17 LAB
ALT SERPL-CCNC: 54 U/L (ref 2–50)
ANION GAP SERPL CALC-SCNC: 10 MMOL/L (ref 7–16)
BUN SERPL-MCNC: 17 MG/DL (ref 8–22)
CALCIUM SERPL-MCNC: 9.1 MG/DL (ref 8.5–10.5)
CHLORIDE SERPL-SCNC: 106 MMOL/L (ref 96–112)
CHOLEST SERPL-MCNC: 175 MG/DL (ref 100–199)
CO2 SERPL-SCNC: 24 MMOL/L (ref 20–33)
CREAT SERPL-MCNC: 0.76 MG/DL (ref 0.5–1.4)
CREAT UR-MCNC: 125.9 MG/DL
ERYTHROCYTE [DISTWIDTH] IN BLOOD BY AUTOMATED COUNT: 42.3 FL (ref 35.9–50)
EST. AVERAGE GLUCOSE BLD GHB EST-MCNC: 100 MG/DL
FASTING STATUS PATIENT QL REPORTED: NORMAL
GFR SERPLBLD CREATININE-BSD FMLA CKD-EPI: 126 ML/MIN/1.73 M 2
GLUCOSE SERPL-MCNC: 107 MG/DL (ref 65–99)
HBA1C MFR BLD: 5.1 % (ref 4–5.6)
HCT VFR BLD AUTO: 47.8 % (ref 42–52)
HDLC SERPL-MCNC: 42 MG/DL
HGB BLD-MCNC: 16.4 G/DL (ref 14–18)
LDLC SERPL CALC-MCNC: 100 MG/DL
MCH RBC QN AUTO: 32.4 PG (ref 27–33)
MCHC RBC AUTO-ENTMCNC: 34.3 G/DL (ref 33.7–35.3)
MCV RBC AUTO: 94.5 FL (ref 81.4–97.8)
MICROALBUMIN UR-MCNC: 3 MG/DL
MICROALBUMIN/CREAT UR: 24 MG/G (ref 0–30)
PLATELET # BLD AUTO: 269 K/UL (ref 164–446)
PMV BLD AUTO: 9.7 FL (ref 9–12.9)
POTASSIUM SERPL-SCNC: 4.3 MMOL/L (ref 3.6–5.5)
RBC # BLD AUTO: 5.06 M/UL (ref 4.7–6.1)
SODIUM SERPL-SCNC: 140 MMOL/L (ref 135–145)
TRIGL SERPL-MCNC: 165 MG/DL (ref 0–149)
TSH SERPL DL<=0.005 MIU/L-ACNC: 2.31 UIU/ML (ref 0.38–5.33)
WBC # BLD AUTO: 6.3 K/UL (ref 4.8–10.8)

## 2022-05-17 PROCEDURE — 80048 BASIC METABOLIC PNL TOTAL CA: CPT

## 2022-05-17 PROCEDURE — 36415 COLL VENOUS BLD VENIPUNCTURE: CPT

## 2022-05-17 PROCEDURE — 85027 COMPLETE CBC AUTOMATED: CPT

## 2022-05-17 PROCEDURE — 84443 ASSAY THYROID STIM HORMONE: CPT

## 2022-05-17 PROCEDURE — 80061 LIPID PANEL: CPT

## 2022-05-17 PROCEDURE — 82570 ASSAY OF URINE CREATININE: CPT

## 2022-05-17 PROCEDURE — 82043 UR ALBUMIN QUANTITATIVE: CPT

## 2022-05-17 PROCEDURE — 83036 HEMOGLOBIN GLYCOSYLATED A1C: CPT

## 2022-05-17 PROCEDURE — 99214 OFFICE O/P EST MOD 30 MIN: CPT | Performed by: INTERNAL MEDICINE

## 2022-05-17 PROCEDURE — 84460 ALANINE AMINO (ALT) (SGPT): CPT

## 2022-05-17 ASSESSMENT — PATIENT HEALTH QUESTIONNAIRE - PHQ9: CLINICAL INTERPRETATION OF PHQ2 SCORE: 0

## 2022-05-17 NOTE — ASSESSMENT & PLAN NOTE
Patient presents today to establish care with new primary care provider.  Patient reported that he has not seen a doctor for over 5 years.  Patient feels well.  He denies fever chills chest pain shortness of breath palpitation or any symptoms.

## 2022-05-17 NOTE — PROGRESS NOTES
"PRIMARY CARE CLINIC VISIT  Chief Complaint   Patient presents with   • New Patient         History of Present Illness     Encounter to establish care with new doctor  Patient presents today to establish care with new primary care provider.  Patient reported that he has not seen a doctor for over 5 years.  Patient feels well.  He denies fever chills chest pain shortness of breath palpitation or any symptoms.    Dyslipidemia-mild, no medications. Diet and exercise.  Lab tests ordered for follow-up.    Obesity (BMI 30-39.9)  This is chronic condition.   Pt is aware of elevated BMI.  Brief discussion with the patient regarding diet, exercise, and lifestyle modification to help achieve and maintain healthy weight          Current Outpatient Medications on File Prior to Visit   Medication Sig Dispense Refill   • ibuprofen (MOTRIN) 200 MG Tab Take 600 mg by mouth every 6 hours as needed for Mild Pain.       No current facility-administered medications on file prior to visit.        Allergies: Other food    ROS  As per HPI above. All other systems reviewed and negative.      Past Medical, Social, and Family history reviewed and updated in EPIC     Objective     /86 (BP Location: Right arm, Patient Position: Sitting, BP Cuff Size: Adult)   Pulse 74   Temp 36.4 °C (97.6 °F) (Temporal)   Resp 17   Ht 1.778 m (5' 10\")   Wt (!) 128 kg (282 lb)   SpO2 96%    Body mass index is 40.46 kg/m².    General: alert and oriented  Cardiovascular: regular rate and rhythm  Pulmonary: lungs : no wheezing   Gastrointestinal: BS present. No obvious mass noted          Assessment and Plan     1. Well adult exam  Routine lab work ordered.  Advised the patient to follow-up after a few days.  - HEMOGLOBIN A1C; Future  - ALANINE AMINO-TRANS; Future  - Basic Metabolic Panel; Future  - CBC WITHOUT DIFFERENTIAL; Future  - Lipid Profile; Future  - TSH; Future  - MICROALBUMIN CREAT RATIO URINE; Future    2. Obesity (BMI 30-39.9)  - Patient " identified as having weight management issue.  Appropriate orders and counseling given.    3.  Dyslipidemia.  Lab tests ordered for follow-up.                    Healthcare Maintenance     Health Maintenance Due   Topic Date Due   • COVID-19 Vaccine (1) Never done   • IMM VARICELLA (CHICKENPOX) VACCINE (2 of 2 - 2-dose childhood series) 06/21/2006     Recommended but the patient declined COVID vaccine and varicella vaccine.           Please note that this dictation was created using voice recognition software. I have made every reasonable attempt to correct obvious errors but there may be errors of grammar and content that I may have overlooked prior to finalization of this note.      Po Atkinson MD  Internal Medicine  [facility]

## 2023-09-11 ENCOUNTER — OCCUPATIONAL MEDICINE (OUTPATIENT)
Dept: URGENT CARE | Facility: PHYSICIAN GROUP | Age: 28
End: 2023-09-11
Payer: COMMERCIAL

## 2023-09-11 VITALS
SYSTOLIC BLOOD PRESSURE: 118 MMHG | BODY MASS INDEX: 37.1 KG/M2 | RESPIRATION RATE: 18 BRPM | DIASTOLIC BLOOD PRESSURE: 72 MMHG | OXYGEN SATURATION: 98 % | HEIGHT: 71 IN | WEIGHT: 265 LBS | TEMPERATURE: 97.6 F | HEART RATE: 68 BPM

## 2023-09-11 DIAGNOSIS — H16.133 PHOTOKERATITIS OF BOTH EYES: ICD-10-CM

## 2023-09-11 PROCEDURE — 3078F DIAST BP <80 MM HG: CPT | Performed by: PHYSICIAN ASSISTANT

## 2023-09-11 PROCEDURE — 3074F SYST BP LT 130 MM HG: CPT | Performed by: PHYSICIAN ASSISTANT

## 2023-09-11 PROCEDURE — 99213 OFFICE O/P EST LOW 20 MIN: CPT | Performed by: PHYSICIAN ASSISTANT

## 2023-09-11 RX ORDER — ERYTHROMYCIN 5 MG/G
OINTMENT OPHTHALMIC
Qty: 3.5 G | Refills: 0 | Status: SHIPPED | OUTPATIENT
Start: 2023-09-11

## 2023-09-11 ASSESSMENT — ENCOUNTER SYMPTOMS
FEVER: 0
VOMITING: 0
EYE REDNESS: 0
PHOTOPHOBIA: 1
BLURRED VISION: 1
EYE PAIN: 1
EYE DISCHARGE: 0
NAUSEA: 0
CHILLS: 0
DIZZINESS: 0
HEADACHES: 0

## 2023-09-11 ASSESSMENT — VISUAL ACUITY: OU: 1

## 2023-09-11 NOTE — LETTER
"EMPLOYEE’S CLAIM FOR COMPENSATION/ REPORT OF INITIAL TREATMENT  FORM C-4  PLEASE TYPE OR PRINT    EMPLOYEE’S CLAIM - PROVIDE ALL INFORMATION REQUESTED   First Name  Corona Last Name  Jose Eduardo Birthdate                    1995                Sex  male Claim Number (Insurer’s Use Only)   Home Address  [address] Age  28 y.o. Height  1.803 m (5' 11\") Weight  120 kg (265 lb) [facility]     [facility] [place]  [address] Telephone  [phone] (home) [phone] (work)   Mailing Address  [address] 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 (Be specific and answer in detail. Use additional sheet if necessary)  [employer]   If you believe that you have an occupational disease, when did you first have knowledge of the disability and its relationship to your employment?  n/a Witnesses to the Accident (if applicable)  n/a      Nature of Injury or Occupational Disease  Workers' Compensation  Part(s) of Body Injured or Affected  Eye (L), Eye (R), N/A    I CERTIFY THAT THE ABOVE IS TRUE AND CORRECT TO T HE BEST OF MY KNOWLEDGE AND THAT I HAVE PROVIDED THIS INFORMATION IN ORDER TO OBTAIN THE BENEFITS OF [place], OR [place]).  I HEREBY AUTHORIZE ANY PHYSICIAN, CHIROPRACTOR, SURGEON, PRACTITIONER OR ANY OTHER PERSON, ANY HOSPITAL, INCLUDING [facility] OR [facility], ANY  MEDICAL SERVICE ORGANIZATION, ANY INSURANCE COMPANY, OR OTHER INSTITUTION OR ORGANIZATION TO RELEASE TO EACH OTHER, ANY MEDICAL OR OTHER INFORMATION, INCLUDING BENEFITS PAID OR PAYABLE, PERTINENT TO THIS INJURY OR DISEASE, EXCEPT INFORMATION RELATIVE TO DIAGNOSIS, TREATMENT AND/OR COUNSELING FOR AIDS, PSYCHOLOGICAL CONDITIONS, ALCOHOL OR CONTROLLED SUBSTANCES, FOR WHICH I MUST GIVE SPECIFIC AUTHORIZATION.  A PHOTOSTAT OF THIS AUTHORIZATION SHALL BE VALID AS THE ORIGINAL.       Date   Place Employee’s Original or  *Electronic Signature   THIS REPORT MUST BE COMPLETED AND MAILED WITHIN 3 WORKING DAYS OF TREATMENT   Place  [facility]  Name of [facility]   Date  9/11/2023 Diagnosis and Description of Injury or Occupational Disease  (H16.133) Photokeratitis of both eyes Is there evidence that the injured employee was under the influence of alcohol and/or another controlled substance at the time of accident?  ? No ? Yes (if yes, please explain)   Hour  3:50 PM   The encounter diagnosis was Photokeratitis of both eyes. No   Treatment  - erythromycin 5 MG/GM Ointment; Instill ~1 cm ribbon into both eye(s) 3 times daily for 5 days  Dispense: 3.5 g; Refill: 0  Symptoms seem to have completely resolved at this point but out of an abundance of caution we will treat with 5 days of erythromycin ointment and see him back 1 more time to make sure symptoms have not recurred and that there are no changes.  He may also apply cool compresses and use OTC analgesics if needed for discomfort.  Also discussed that he should avoid light exposure.  Recommend use of sunglasses while outside and should avoid any activities where he is staring at  bright lights.  D39 and C4 completed.    Have you advised the patient to remain off work five days or     more?    X-Ray Findings      ? Yes Indicate dates:   From   To      From information given by the employee, together with medical evidence, can        you directly connect this injury or occupational disease as job incurred?  Yes ? No If no, is the injured employee capable of:  ? full duty  No ? modified duty  Yes   Is additional medical care by a physician indicated?  Yes If modified duty, specify any limitations / restrictions  See D39   Do you know of any previous injury or disease contributing to this condition or occupational disease?  ? Yes ? No (Explain if yes)                          No   Date  9/11/2023 Print Health Care Provider's  Name  Darvin Massey P.A.-C. I certify that the employer’s copy of  this form was delivered to the employer on:   Address  [address] Insurer’s Use Only     [facility]  [id number]    Provider’s Tax ID Number  [id number] Telephone  Dept: [phone]             Health Care Provider’s Original or Electronic Signature  e-SignDARVIN MASSEY P.A.-C. Degree (MD,DO, DC,PAVickiC,APRN)  PA-C      * Complete and attach Release of Information (Form C-4A) when injured employee signs C-4 Form electronically  ORIGINAL - TREATING HEALTHCARE PROVIDER PAGE 2 - INSURER/TPA PAGE 3 - EMPLOYER PAGE 4 - EMPLOYEE             Form C-4 (rev.08/21)           BRIEF DESCRIPTION OF RIGHTS AND BENEFITS  (Pursuant to [place] 616[place].050)    Notice of Injury or Occupational Disease (Incident Report Form C-1): If an injury or occupational disease (OD) arises out of and in the course of employment, you must provide written notice to your employer as soon as practicable, but no later than 7 days after the accident or OD. Your employer shall maintain a sufficient supply of the required forms.    Claim for Compensation (Form C-4): If medical treatment is sought, the form C-4 is  "available at the place of initial treatment. A completed \"Claim for Compensation\" (Form C-4) must be filed within 90 days after an accident or OD. The treating physician or chiropractor must, within 3 working days after treatment, complete and mail to the employer, the employer's insurer and third-party , the Claim for Compensation.    Medical Treatment: If you require medical treatment for your on-the-job injury or OD, you may be required to select a physician or chiropractor from a list provided by your workers’ compensation insurer, if it has contracted with an Organization for Managed Care (MCO) or Preferred Provider Organization (PPO) or providers of health care. If your employer has not entered into a contract with an MCO or PPO, you may select a physician or chiropractor from the Panel of Physicians and Chiropractors. Any medical costs related to your industrial injury or OD will be paid by your insurer.    Temporary Total Disability (TTD): If your doctor has certified that you are unable to work for a period of at least 5 consecutive days, or 5 cumulative days in a 20-day period, or places restrictions on you that your employer does not accommodate, you may be entitled to TTD compensation.    Temporary Partial Disability (TPD): If the wage you receive upon reemployment is less than the compensation for TTD to which you are entitled, the insurer may be required to pay you TPD compensation to make up the difference. TPD can only be paid for a maximum of 24 months.    Permanent Partial Disability (PPD): When your medical condition is stable and there is an indication of a PPD as a result of your injury or OD, within 30 days, your insurer must arrange for an evaluation by a rating physician or chiropractor to determine the degree of your PPD. The amount of your PPD award depends on the date of injury, the results of the PPD evaluation, your age and wage.    Permanent Total Disability (PTD): If you " are medically certified by a treating physician or chiropractor as permanently and totally disabled and have been granted a PTD status by your insurer, you are entitled to receive monthly benefits not to exceed 66 2/3% of your average monthly wage. The amount of your PTD payments is subject to reduction if you previously received a lump-sum PPD award.    Vocational Rehabilitation Services: You may be eligible for vocational rehabilitation services if you are unable to return to the job due to a permanent physical impairment or permanent restrictions as a result of your injury or occupational disease.    Transportation and Per Nadir Reimbursement: You may be eligible for travel expenses and per nadir associated with medical treatment.    Reopening: You may be able to reopen your claim if your condition worsens after claim closure.     Appeal Process: If you disagree with a written determination issued by the insurer or the insurer does not respond to your request, you may appeal to the Department of Administration, , by following the instructions contained in your determination letter. You must appeal the determination within 70 days from the date of the determination letter at [address], [address], [place], [place] [address], or [address], [address], [place] [address]. If you disagree with the  decision, you may appeal to the Department of Administration, . You must file your appeal within 30 days from the date of the  decision letter at [address], [address], [place], [place] [address], or [address], [place], [place] [address]. If you disagree with a decision of an , you may file a petition for judicial review with the District Court. You must do so within 30 days of the Appeal Officer’s decision. You may be represented by an  at your own expense or you may contact the [facility] for  possible representation.    [place]  for Injured Workers (NAIW): If you disagree with a  decision, you may request that NAIW represent you without charge at an  Hearing. For information regarding denial of benefits, you may contact the NAIW at: [address], [address], [place] [address], [phone], or [address], [address], [place] [address], [phone]    To File a Complaint with the Division: If you wish to file a complaint with the  of the Division of Industrial Relations (DIR),  please contact the Workers’ Compensation Section, [address], [address], [place], [place] [address], telephone ([phone]) [phone], or [address], [address], [place], [place] [address], telephone [phone].    For assistance with Workers’ Compensation Issues: You may contact the [facility] Office for Consumer Health Assistance, [address], [address], [place] [address], Toll Free 1-285.823.1948, Web site: http://[facility].[place].gov/Programs/ROLANDO E-mail: [email or web address].[email or web address]              __________________________________________________________________                                    _________________            Employee Name / Signature                                                                                                                            Date                                                                                                                                                                                                                              D-2 (rev. 10/20)

## 2023-09-11 NOTE — LETTER
"   [facility] [address] [place] [address]  Phone:  [phone] - Fax:  [phone]   [facility] Progress Report and Disability Certification  Date of Service: 9/11/2023   No Show:  No  Date / Time of Next Visit: 9/16/2023   Claim Information   Patient Name: Corona Whitt  Claim Number:     Employer: [employer]  Date of Injury: 9/6/2023     Insurer / TPA: [employer]  ID / SSN:     Occupation: technician  Diagnosis: The encounter diagnosis was Photokeratitis of both eyes.    Medical Information   Related to Industrial Injury? Yes    Subjective Complaints:  DOI: 9/06/2023    Corona Whitt is a 28 y.o. male who presents today for evaluation of visual changes.  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  Says he touched a negative and a positive charge on the battery and noted a \"flash\" in front of him.  Says that after this \"dark flash\" he was having some cloudy/blurry vision and eye pain that gradually resolved over 48 to 72 hours.  He has not noticed any symptoms at all over the past day or 2 but wants to come in and get checked out.  Is been no discharge from his eyes.  He does not wear contact lenses.       Objective Findings: Eyes:      General: Vision grossly intact.         Right eye: No discharge.         Left eye: No discharge.      Extraocular Movements: Extraocular movements intact.      Conjunctiva/sclera: Conjunctivae normal.      Right eye: Right conjunctiva is not injected. No chemosis.     Left eye: Left conjunctiva is not injected. No chemosis.     Pupils: Pupils are equal, round, and reactive to light.      Comments:  VISUAL ACUITY: OD 20/50, OS 20/40, OU 20/30      Pre-Existing Condition(s):     Assessment:   Initial Visit    Status: Additional Care Required  Permanent Disability:No    Plan: Medication    Diagnostics:      Comments:     "   Disability Information   Status: Released to Restricted Duty    From:  9/11/2023  Through: 9/16/2023 Restrictions are: Temporary   Physical Restrictions   Sitting:    Standing:    Stooping:    Bending:      Squatting:    Walking:    Climbing:    Pushing:      Pulling:    Other:    Reaching Above Shoulder (L):   Reaching Above Shoulder (R):       Reaching Below Shoulder (L):    Reaching Below Shoulder (R):      Not to exceed Weight Limits   Carrying(hrs):   Weight Limit(lb):   Lifting(hrs):   Weight  Limit(lb):     Comments: Patient should avoid any activities that involve him looking at bright lights.    Repetitive Actions   Hands: i.e. Fine Manipulations from Grasping:     Feet: i.e. Operating Foot Controls:     Driving / Operate Machinery:     Health Care Provider’s Original or Electronic Signature  Amanda Tee P.A.-C. Health Care Provider’s Original or Electronic Signature    Hiram Walter DO MPH     Clinic Name / Location: [address] [address] Clinic Phone Number: Dept: [phone]   Appointment Time: 3:15 Pm Visit Start Time: 3:50 PM   Check-In Time:  3:34 Pm Visit Discharge Time:  4:18 PM    Original-Treating Physician or Chiropractor    Page 2-Insurer/TPA    Page 3-Employer    Page 4-Employee

## 2023-09-11 NOTE — PROGRESS NOTES
"Subjective     Corona Whitt is a 28 y.o. male who presents with Other (WC New /X 5 days pt touch a negative and positive charge on a battery, and had a big flash in front of him, pt did have cloudy vision /DOI:9/6/23)      DOI: 9/06/2023    Corona Whitt is a 28 y.o. male who presents today for evaluation of visual changes.  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  Says he touched a negative and a positive charge on the battery and noted a \"flash\" in front of him.  Says that after this \"dark flash\" he was having some cloudy/blurry vision and eye pain that gradually resolved over 48 to 72 hours.  He has not noticed any symptoms at all over the past day or 2 but wants to come in and get checked out.  Is been no discharge from his eyes.  He does not wear contact lenses.        Review of Systems   Constitutional:  Negative for chills and fever.   Eyes:  Positive for blurred vision, photophobia and pain. Negative for discharge and redness.   Gastrointestinal:  Negative for nausea and vomiting.   Neurological:  Negative for dizziness and headaches.           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     Appearance: He is not diaphoretic.   HENT:      Head: Normocephalic and atraumatic.      Right Ear: External ear normal.      Left Ear: External ear normal.   Eyes:      General: Vision grossly intact.         Right eye: No discharge.         Left eye: No discharge.      Extraocular Movements: Extraocular " movements intact.      Conjunctiva/sclera: Conjunctivae normal.      Right eye: Right conjunctiva is not injected. No chemosis.     Left eye: Left conjunctiva is not injected. No chemosis.     Pupils: Pupils are equal, round, and reactive to light.      Comments:  VISUAL ACUITY: OD 20/50, OS 20/40, OU 20/30   Pulmonary:      Effort: Pulmonary effort is normal. No respiratory distress.   Musculoskeletal:      Cervical back: Normal range of motion.   Skin:     Findings: No rash.   Neurological:      Mental Status: He is alert and oriented to person, place, and time.   Psychiatric:         Mood and Affect: Mood and affect normal.         Cognition and Memory: Memory normal.         Judgment: Judgment normal.           Assessment & Plan     1. Photokeratitis of both eyes  - erythromycin 5 MG/GM Ointment; Instill ~1 cm ribbon into both eye(s) 3 times daily for 5 days  Dispense: 3.5 g; Refill: 0  Symptoms seem to have completely resolved at this point but out of an abundance of caution we will treat with 5 days of erythromycin ointment and see him back 1 more time to make sure symptoms have not recurred and that there are no changes.  He may also apply cool compresses and use OTC analgesics if needed for discomfort.  Also discussed that he should avoid light exposure.  Recommend use of sunglasses while outside and should avoid any activities where he is staring at bright lights.  D39 and C4 completed.

## 2023-09-16 ENCOUNTER — OCCUPATIONAL MEDICINE (OUTPATIENT)
Dept: URGENT CARE | Facility: PHYSICIAN GROUP | Age: 28
End: 2023-09-16
Payer: COMMERCIAL

## 2023-09-16 VITALS
OXYGEN SATURATION: 98 % | TEMPERATURE: 97.2 F | WEIGHT: 271.17 LBS | DIASTOLIC BLOOD PRESSURE: 82 MMHG | HEART RATE: 60 BPM | HEIGHT: 71 IN | BODY MASS INDEX: 37.96 KG/M2 | RESPIRATION RATE: 18 BRPM | SYSTOLIC BLOOD PRESSURE: 128 MMHG

## 2023-09-16 DIAGNOSIS — H16.133 PHOTOKERATITIS OF BOTH EYES: ICD-10-CM

## 2023-09-16 PROCEDURE — 99212 OFFICE O/P EST SF 10 MIN: CPT | Performed by: FAMILY MEDICINE

## 2023-09-16 PROCEDURE — 3079F DIAST BP 80-89 MM HG: CPT | Performed by: FAMILY MEDICINE

## 2023-09-16 PROCEDURE — 3074F SYST BP LT 130 MM HG: CPT | Performed by: FAMILY MEDICINE

## 2023-09-16 ASSESSMENT — ENCOUNTER SYMPTOMS
EYE REDNESS: 0
EYE DISCHARGE: 0
EYE PAIN: 0
FEVER: 0

## 2023-09-16 NOTE — PROGRESS NOTES
"Subjective:     Corona Whitt is a 28 y.o. male who presents for Paperwork (Workers comp fv.)    HPI  Pt presents for follow-up  DOI: 9/6/2023  PASTOR: Touched a negative and positive charge on the battery and noted a \"flash\" in front of him which caused immediate cloudy/blurry vision and eye pain  Patient seen in urgent care on 9/11 and given erythromycin ointment for 5 days  Overall making great improvements   Has slight sensitivity to bright light still   No blurry vision or changes in vision  No redness of the eyes or discharge    Review of Systems   Constitutional:  Negative for fever.   Eyes:  Negative for pain, discharge and redness.   Skin:  Negative for rash.       PMH: Past medical history reviewed in Epic  MEDS: Medications were reviewed in Epic  ALLERGIES: Allergies were reviewed in Epic     Objective:   /82 (BP Location: Right arm, Patient Position: Sitting, BP Cuff Size: Adult)   Pulse 60   Temp 36.2 °C (97.2 °F) (Temporal)   Resp 18   Ht 1.803 m (5' 11\")   Wt 123 kg (271 lb 2.7 oz)   SpO2 98%   BMI 37.82 kg/m²     Physical Exam  Constitutional:       General: He is not in acute distress.     Appearance: He is well-developed. He is not diaphoretic.   Eyes:      General: No scleral icterus.        Right eye: No discharge.         Left eye: No discharge.      Extraocular Movements: Extraocular movements intact.      Conjunctiva/sclera: Conjunctivae normal.      Pupils: Pupils are equal, round, and reactive to light.   Pulmonary:      Effort: Pulmonary effort is normal.   Neurological:      Mental Status: He is alert.       Assessment/Plan:   Assessment    1. Photokeratitis of both eyes    Patient doing well and symptoms have mostly resolved.  Continues to have slight photophobia when he is around a very bright light.  However, otherwise has no visual changes, no eye pain, and feels well.  Advised that the slight photophobia should continue to resolve over the next few weeks.  As long as he " is doing well, no further follow-up required.  He is released at this time as MMI.

## 2023-09-16 NOTE — LETTER
"   [facility] [address] [address]  Phone:  [phone] - Fax:  [phone]   [facility] Progress Report and Disability Certification  Date of Service: 9/16/2023   No Show:  No  Date / Time of Next Visit:     Claim Information   Patient Name: Corona Whitt  Claim Number:     Employer: [employer]  Date of Injury: 9/6/2023     Insurer / TPA: [employer]  ID / SSN:     Occupation: technician  Diagnosis: The encounter diagnosis was Photokeratitis of both eyes.    Medical Information   Related to Industrial Injury? Yes    Subjective Complaints:  Pt presents for follow-up  DOI: 9/6/2023  PASTOR: Touched a negative and positive charge on the battery and noted a \"flash\" in front of him which caused immediate cloudy/blurry vision and eye pain  Patient seen in urgent care on 9/11 and given erythromycin ointment for 5 days  Overall making great improvements   Has slight sensitivity to bright light still   No blurry vision or changes in vision  No redness of the eyes or discharge   Objective Findings: Eyes:      General: No scleral icterus.        Right eye: No discharge.         Left eye: No discharge.      Extraocular Movements: Extraocular movements intact.      Conjunctiva/sclera: Conjunctivae normal.      Pupils: Pupils are equal, round, and reactive to light.   Pre-Existing Condition(s):     Assessment:   Condition Improved    Status: Discharged /  MMI  Permanent Disability:No    Plan:      Diagnostics:      Comments:       Disability Information   Status: Released to Full Duty    From:     Through:   Restrictions are:     Physical Restrictions   Sitting:    Standing:    Stooping:    Bending:      Squatting:    Walking:    Climbing:    Pushing:      Pulling:    Other:    Reaching Above Shoulder (L):   Reaching Above Shoulder (R):       Reaching Below Shoulder (L):    Reaching Below Shoulder (R):      Not to exceed Weight Limits   Carrying(hrs): "   Weight Limit(lb):   Lifting(hrs):   Weight  Limit(lb):     Comments: Patient doing well.  Released as MMI.    Repetitive Actions   Hands: i.e. Fine Manipulations from Grasping:     Feet: i.e. Operating Foot Controls:     Driving / Operate Machinery:     Health Care Provider’s Original or Electronic Signature  Tony Ratliff M.D. Health Care Provider’s Original or Electronic Signature    Hiram Walter DO MPH     Clinic Name / Location: [address] [address] Clinic Phone Number: Dept: [phone]   Appointment Time: 10:00 Am Visit Start Time: 10:06 AM   Check-In Time:  10:02 Am Visit Discharge Time:  10:58 AM   Original-Treating Physician or Chiropractor    Page 2-Insurer/TPA    Page 3-Employer    Page 4-Employee